# Patient Record
Sex: FEMALE | Race: OTHER | URBAN - METROPOLITAN AREA
[De-identification: names, ages, dates, MRNs, and addresses within clinical notes are randomized per-mention and may not be internally consistent; named-entity substitution may affect disease eponyms.]

---

## 2022-01-05 ENCOUNTER — EMERGENCY (EMERGENCY)
Facility: HOSPITAL | Age: 42
LOS: 0 days | Discharge: HOME | End: 2022-01-05
Attending: EMERGENCY MEDICINE | Admitting: EMERGENCY MEDICINE
Payer: SELF-PAY

## 2022-01-05 VITALS
SYSTOLIC BLOOD PRESSURE: 136 MMHG | RESPIRATION RATE: 17 BRPM | OXYGEN SATURATION: 99 % | WEIGHT: 139.99 LBS | DIASTOLIC BLOOD PRESSURE: 72 MMHG | TEMPERATURE: 99 F | HEART RATE: 80 BPM

## 2022-01-05 DIAGNOSIS — S01.91XA LACERATION WITHOUT FOREIGN BODY OF UNSPECIFIED PART OF HEAD, INITIAL ENCOUNTER: ICD-10-CM
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
## 2022-01-05 DIAGNOSIS — Z23 ENCOUNTER FOR IMMUNIZATION: ICD-10-CM

## 2022-01-05 PROCEDURE — 12013 RPR F/E/E/N/L/M 2.6-5.0 CM: CPT

## 2022-01-05 PROCEDURE — 99283 EMERGENCY DEPT VISIT LOW MDM: CPT | Mod: 25

## 2022-01-05 RX ORDER — ACETAMINOPHEN 500 MG
975 TABLET ORAL ONCE
Refills: 0 | Status: COMPLETED | OUTPATIENT
Start: 2022-01-05 | End: 2022-01-05

## 2022-01-05 RX ORDER — TETANUS TOXOID, REDUCED DIPHTHERIA TOXOID AND ACELLULAR PERTUSSIS VACCINE, ADSORBED 5; 2.5; 8; 8; 2.5 [IU]/.5ML; [IU]/.5ML; UG/.5ML; UG/.5ML; UG/.5ML
0.5 SUSPENSION INTRAMUSCULAR ONCE
Refills: 0 | Status: COMPLETED | OUTPATIENT
Start: 2022-01-05 | End: 2022-01-05

## 2022-01-05 RX ADMIN — TETANUS TOXOID, REDUCED DIPHTHERIA TOXOID AND ACELLULAR PERTUSSIS VACCINE, ADSORBED 0.5 MILLILITER(S): 5; 2.5; 8; 8; 2.5 SUSPENSION INTRAMUSCULAR at 11:03

## 2022-01-05 RX ADMIN — Medication 975 MILLIGRAM(S): at 11:09

## 2022-01-05 NOTE — ED PROVIDER NOTE - PATIENT PORTAL LINK FT
You can access the FollowMyHealth Patient Portal offered by [facility] by registering at the following website: [email or web address]. By joining [facility]’s FollowMyHealth portal, you will also be able to view your health information using other applications (apps) compatible with our system.

## 2022-01-05 NOTE — ED PROVIDER NOTE - OBJECTIVE STATEMENT
42yo female with no significant PMHx presents to ED c/o laceration to top of head and inability to control bleeding at home s/p slip and fall on ice down 3 stairs a few hours PTA. Pt report she stepped out of her home and steps were icy, causing her to slip and fall forward down 3 steps, striking top of head onto a paving stone. No LOC. Pt was able to stand up on her own and has been ambulatory since. Pt denies neck pain, extremity trauma,, dizziness, lightheadedness or syncope. No blurred vision, N/V. TD not UTD

## 2022-01-05 NOTE — ED PROVIDER NOTE - NSFOLLOWUPINSTRUCTIONS_ED_ALL_ED_FT
KEEP SUTURES CLEAN AND DRY FOR 24HRS, AFTER 24HRS YOU MAY WASH GENTLY. HAVE SUTURES REMOVED IN 7-10 DAYS          HEAD LACERATION - AfterCare(R) Instructions(ER/ED)           Head Laceration    WHAT YOU NEED TO KNOW:    A laceration happens when the skin and other tissues are torn. Head lacerations usually bleed more than other types of lacerations.    DISCHARGE INSTRUCTIONS:    Have someone call your local emergency number (911 in the [place]) if:   •You cannot be woken.      •Your mood or behavior changes.      Call your doctor if:   •The area is red, warm, or has pus coming from it.      •The area begins to bleed and does not stop after 15 minutes of pressure.      •You have questions or concerns about your condition or care.      Rest. Some activities may cause too much pressure in your head. Your laceration may begin to bleed.    Ice the area. Apply ice to the area for 15 to 20 minutes every hour or as directed. Use an ice pack, or put crushed ice in a plastic bag. Cover it with a towel before you apply it. Ice helps prevent tissue damage and decreases swelling and pain.    Keep the area clean and dry. Your healthcare provider will tell you how to clean the area.    Check the area every day for signs of infection. Signs of infection may include redness, pus, and warmth around the area. Call your doctor if you find any signs of infection.    Do not smoke. Nicotine and other chemicals in cigarettes and cigars can prevent your wound from healing. Ask your healthcare provider for information if you currently smoke and need help to quit. E-cigarettes or smokeless tobacco still contain nicotine. Talk to your healthcare provider before you use these products.    Follow up with your doctor as directed: Write down your questions so you remember to ask them during your visits.

## 2022-01-05 NOTE — ED PROVIDER NOTE - NS ED ROS FT
CONST: No fever, chills or bodyaches  EYES: No pain, redness, drainage or visual changes.  CARD: No chest pain, palpitations  RESP: No SOB, cough  GI: No abdominal pain, N/V/D  MS: No joint pain, back pain or extremity pain/injury  SKIN: laceration to top of head  NEURO: No headache, dizziness, paresthesias or LOC

## 2022-01-05 NOTE — ED ADULT NURSE NOTE - NSIMPLEMENTINTERV_GEN_ALL_ED
Implemented All Universal Safety Interventions:  [place] to call system. Call bell, personal items and telephone within reach. Instruct patient to call for assistance. Room bathroom lighting operational. Non-slip footwear when patient is off stretcher. Physically safe environment: no spills, clutter or unnecessary equipment. Stretcher in lowest position, wheels locked, appropriate side rails in place.

## 2022-01-05 NOTE — ED PROVIDER NOTE - PHYSICAL EXAMINATION
CONST: Well appearing in NAD  EYES: PERRL, EOMI, Sclera and conjunctiva clear.   ENT: No nasal discharge. TM's clear B/L without drainage. Oropharynx normal appearing, no erythema or exudates. Uvula midline.  NECK: Non-tender, no c-spine tenderness  CARD: Normal S1 S2; Normal rate and rhythm  RESP: Equal BS B/L, No wheezes, rhonchi or rales. No distress  GI: Soft, non-tender, non-distended.  MS: Normal ROM in all extremities. No midline spinal tenderness.  SKIN: Top of head with widened laceration approx. 3.5 cm, mild active bleeding  NEURO: A&Ox3, No focal deficits. Strength 5/5 with no sensory deficits. Steady gait

## 2022-01-13 ENCOUNTER — EMERGENCY (EMERGENCY)
Facility: HOSPITAL | Age: 42
LOS: 0 days | Discharge: HOME | End: 2022-01-13
Attending: EMERGENCY MEDICINE | Admitting: EMERGENCY MEDICINE
Payer: SELF-PAY

## 2022-01-13 VITALS
TEMPERATURE: 99 F | OXYGEN SATURATION: 99 % | RESPIRATION RATE: 16 BRPM | HEART RATE: 85 BPM | SYSTOLIC BLOOD PRESSURE: 135 MMHG | DIASTOLIC BLOOD PRESSURE: 67 MMHG

## 2022-01-13 DIAGNOSIS — S01.01XD LACERATION WITHOUT FOREIGN BODY OF SCALP, SUBSEQUENT ENCOUNTER: ICD-10-CM
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
## 2022-01-13 PROCEDURE — L9995: CPT

## 2022-01-13 NOTE — ED PROVIDER NOTE - PATIENT PORTAL LINK FT
You can access the FollowMyHealth Patient Portal offered by [facility] by registering at the following website: [email or web address]. By joining [employer]’s FollowMyHealth portal, you will also be able to view your health information using other applications (apps) compatible with our system.

## 2022-01-13 NOTE — ED ADULT NURSE NOTE - CAS EDP DISCH TYPE
Anatomy scan today all WNL  Estimated FW: 412 gm. 0 lb 15 oz 51 %Tile  No complaints other than headaches which is relieved by Fioricet. Reiterated pre-e symptoms and when to call.  RTC in 6 weeks for DMS     Home

## 2022-01-13 NOTE — ED PROVIDER NOTE - CLINICAL SUMMARY MEDICAL DECISION MAKING FREE TEXT BOX
41-year-old female presented to the ED for staple removal status post scalp laceration repair.  Wound healing well.  Staples removed.  Patient given head injury instructions.

## 2022-01-13 NOTE — ED PROVIDER NOTE - NSFOLLOWUPINSTRUCTIONS_ED_ALL_ED_FT
Follow up with your Primary Care Doctor as needed.     Please return to the Emergency Room for any new or worsening symptoms.

## 2022-01-13 NOTE — ED PROVIDER NOTE - ATTENDING CONTRIBUTION TO CARE
I personally evaluated the patient. I reviewed the Resident’s or Physician Assistant’s note (as assigned above), and agree with the findings and plan except as documented in my note.   41-YEAR-OLD FEMALE WITH NO SIGNIFICANT PAST MEDICAL HISTORY PRESENTED TO THE ED FOR SUTURE REMOVAL FROM SCALP STATUS POST FALL ON ICE 8 DAYS AGO.  PATIENT DENIES ANY WOUND DISCHARGE, REDNESS, PAIN.  PATIENT DENIES ANY HEADACHES, NAUSEA, VOMITING, DIZZINESS.  PATIENT DENIES ANY NECK PAIN.  VITALS NOTED. ALERT OX3 NAD GCS-15. SCALP LACERATION WITH STAPLES IN PLACE. NO EVIDENCE OF WOUND INFECTION. NEURO EXAM NONFOCAL.

## 2022-01-13 NOTE — ED PROVIDER NOTE - PHYSICAL EXAMINATION
Vital Signs: I have reviewed the initial vital signs.  Constitutional: well-nourished, appears stated age, no acute distress  Cardiovascular: regular rate, regular rhythm, well-perfused extremities  Respiratory: unlabored respiratory effort, clear to auscultation bilaterally  Gastrointestinal: soft, non-tender abdomen  Musculoskeletal: supple neck, no lower extremity edema  Integumentary: warm, dry, no rash, (+) well healed 3.5 cm scalp laceration at the top of the head with 5 sutures   Neurologic: awake, alert, extremities’ motor and sensory functions grossly intact  Psychiatric: appropriate mood, appropriate affect

## 2023-03-21 NOTE — ED PROVIDER NOTE - PROVIDER TOKENS
29.4
FREE:[LAST:[CARE PROVIDER],FIRST:[YOUR PRIMARY],PHONE:[(   )    -],FAX:[(   )    -],FOLLOWUP:[1-3 Days]]

## 2023-04-15 NOTE — ED PROCEDURE NOTE - LENGTH OF LACERATION
3.5

## 2023-08-29 NOTE — ED ADULT NURSE NOTE - PAIN: BODY LOCATION
Detail Level: Detailed Depth Of Biopsy: dermis Was A Bandage Applied: Yes Size Of Lesion In Cm: 1.5 X Size Of Lesion In Cm: 0 Biopsy Type: H and E Biopsy Method: Dermablade Anesthesia Type: 1% lidocaine without epinephrine Anesthesia Volume In Cc (Will Not Render If 0): 0.5 Hemostasis: Electrocautery Wound Care: Aquaphor Dressing: bandage Destruction After The Procedure: No Type Of Destruction Used: Curettage Curettage Text: The wound bed was treated with curettage after the biopsy was performed. Cryotherapy Text: The wound bed was treated with cryotherapy after the biopsy was performed. Electrodesiccation Text: The wound bed was treated with electrodesiccation after the biopsy was performed. Electrodesiccation And Curettage Text: The wound bed was treated with electrodesiccation and curettage after the biopsy was performed. head Silver Nitrate Text: The wound bed was treated with silver nitrate after the biopsy was performed. Lab: [id number] Lab Facility: [id number] Consent: Written consent was obtained and risks were reviewed including but not limited to scarring, infection, bleeding, scabbing, incomplete removal, nerve damage and allergy to anesthesia. Post-Care Instructions: I reviewed with the patient in detail post-care instructions. Patient is to keep the biopsy site dry overnight, and then apply bacitracin twice daily until healed. Patient may apply hydrogen peroxide soaks to remove any crusting. Notification Instructions: Patient will be notified of biopsy results. However, patient instructed to call the office if not contacted within 2 weeks. Billing Type: Third-Party Bill Information: Selecting Yes will display possible errors in your note based on the variables you have selected. This validation is only offered as a suggestion for you. PLEASE NOTE THAT THE VALIDATION TEXT WILL BE REMOVED WHEN YOU FINALIZE YOUR NOTE. IF YOU WANT TO FAX A PRELIMINARY NOTE YOU WILL NEED TO TOGGLE THIS TO 'NO' IF YOU DO NOT WANT IT IN YOUR FAXED NOTE.

## 2024-02-20 NOTE — ED PROCEDURE NOTE - NS ED PROC PERFORMED BY1 FT
LM for patient stating results will be put in a letter and sent through [facility].  Gave our clinic phone number in case she has any questions.    Also, result letter sent with information below.     Health history updated and recall entered.     RADHA Aguilar